# Patient Record
Sex: FEMALE | Race: WHITE | NOT HISPANIC OR LATINO | Employment: OTHER | URBAN - METROPOLITAN AREA
[De-identification: names, ages, dates, MRNs, and addresses within clinical notes are randomized per-mention and may not be internally consistent; named-entity substitution may affect disease eponyms.]

---

## 2025-02-11 ENCOUNTER — APPOINTMENT (OUTPATIENT)
Dept: RADIOLOGY | Facility: HOSPITAL | Age: 55
End: 2025-02-11
Payer: COMMERCIAL

## 2025-02-11 ENCOUNTER — HOSPITAL ENCOUNTER (EMERGENCY)
Facility: HOSPITAL | Age: 55
Discharge: HOME | End: 2025-02-11
Attending: STUDENT IN AN ORGANIZED HEALTH CARE EDUCATION/TRAINING PROGRAM
Payer: COMMERCIAL

## 2025-02-11 VITALS
WEIGHT: 245 LBS | SYSTOLIC BLOOD PRESSURE: 120 MMHG | RESPIRATION RATE: 19 BRPM | HEIGHT: 65 IN | DIASTOLIC BLOOD PRESSURE: 83 MMHG | OXYGEN SATURATION: 99 % | TEMPERATURE: 98.1 F | BODY MASS INDEX: 40.82 KG/M2 | HEART RATE: 80 BPM

## 2025-02-11 DIAGNOSIS — K44.9 HIATAL HERNIA: ICD-10-CM

## 2025-02-11 DIAGNOSIS — S52.222A CLOSED DISPLACED TRANSVERSE FRACTURE OF SHAFT OF LEFT ULNA, INITIAL ENCOUNTER: ICD-10-CM

## 2025-02-11 DIAGNOSIS — N28.89 RENAL MASS: Primary | ICD-10-CM

## 2025-02-11 DIAGNOSIS — K76.0 FATTY LIVER: ICD-10-CM

## 2025-02-11 LAB
ABO GROUP (TYPE) IN BLOOD: NORMAL
ALBUMIN SERPL BCP-MCNC: 4.1 G/DL (ref 3.4–5)
ALP SERPL-CCNC: 51 U/L (ref 33–110)
ALT SERPL W P-5'-P-CCNC: 26 U/L (ref 7–45)
AMPHETAMINES UR QL SCN: ABNORMAL
ANION GAP SERPL CALC-SCNC: 15 MMOL/L (ref 10–20)
ANTIBODY SCREEN: NORMAL
APAP SERPL-MCNC: <10 UG/ML
AST SERPL W P-5'-P-CCNC: 18 U/L (ref 9–39)
B-HCG SERPL-ACNC: <2 MIU/ML
BARBITURATES UR QL SCN: ABNORMAL
BASOPHILS # BLD AUTO: 0.03 X10*3/UL (ref 0–0.1)
BASOPHILS NFR BLD AUTO: 0.4 %
BENZODIAZ UR QL SCN: ABNORMAL
BILIRUB SERPL-MCNC: 0.2 MG/DL (ref 0–1.2)
BUN SERPL-MCNC: 17 MG/DL (ref 6–23)
BZE UR QL SCN: ABNORMAL
CALCIUM SERPL-MCNC: 9.3 MG/DL (ref 8.6–10.3)
CANNABINOIDS UR QL SCN: ABNORMAL
CHLORIDE SERPL-SCNC: 104 MMOL/L (ref 98–107)
CO2 SERPL-SCNC: 24 MMOL/L (ref 21–32)
CREAT SERPL-MCNC: 0.7 MG/DL (ref 0.5–1.05)
EGFRCR SERPLBLD CKD-EPI 2021: >90 ML/MIN/1.73M*2
EOSINOPHIL # BLD AUTO: 0.13 X10*3/UL (ref 0–0.7)
EOSINOPHIL NFR BLD AUTO: 1.9 %
ERYTHROCYTE [DISTWIDTH] IN BLOOD BY AUTOMATED COUNT: 13 % (ref 11.5–14.5)
ETHANOL SERPL-MCNC: 51 MG/DL
FENTANYL+NORFENTANYL UR QL SCN: ABNORMAL
GLUCOSE SERPL-MCNC: 108 MG/DL (ref 74–99)
HCT VFR BLD AUTO: 36.5 % (ref 36–46)
HGB BLD-MCNC: 11.8 G/DL (ref 12–16)
IMM GRANULOCYTES # BLD AUTO: 0.01 X10*3/UL (ref 0–0.7)
IMM GRANULOCYTES NFR BLD AUTO: 0.1 % (ref 0–0.9)
INR PPP: 1 (ref 0.9–1.1)
LACTATE SERPL-SCNC: 1.9 MMOL/L (ref 0.4–2)
LYMPHOCYTES # BLD AUTO: 2.13 X10*3/UL (ref 1.2–4.8)
LYMPHOCYTES NFR BLD AUTO: 30.6 %
MCH RBC QN AUTO: 29.1 PG (ref 26–34)
MCHC RBC AUTO-ENTMCNC: 32.3 G/DL (ref 32–36)
MCV RBC AUTO: 90 FL (ref 80–100)
METHADONE UR QL SCN: ABNORMAL
MONOCYTES # BLD AUTO: 0.59 X10*3/UL (ref 0.1–1)
MONOCYTES NFR BLD AUTO: 8.5 %
NEUTROPHILS # BLD AUTO: 4.06 X10*3/UL (ref 1.2–7.7)
NEUTROPHILS NFR BLD AUTO: 58.5 %
NRBC BLD-RTO: 0 /100 WBCS (ref 0–0)
OPIATES UR QL SCN: ABNORMAL
OXYCODONE+OXYMORPHONE UR QL SCN: ABNORMAL
PCP UR QL SCN: ABNORMAL
PLATELET # BLD AUTO: 263 X10*3/UL (ref 150–450)
POTASSIUM SERPL-SCNC: 3.8 MMOL/L (ref 3.5–5.3)
PROT SERPL-MCNC: 6.7 G/DL (ref 6.4–8.2)
PROTHROMBIN TIME: 11.6 SECONDS (ref 9.8–12.8)
RBC # BLD AUTO: 4.06 X10*6/UL (ref 4–5.2)
RH FACTOR (ANTIGEN D): NORMAL
SALICYLATES SERPL-MCNC: <3 MG/DL
SODIUM SERPL-SCNC: 139 MMOL/L (ref 136–145)
WBC # BLD AUTO: 7 X10*3/UL (ref 4.4–11.3)

## 2025-02-11 PROCEDURE — 36415 COLL VENOUS BLD VENIPUNCTURE: CPT | Performed by: STUDENT IN AN ORGANIZED HEALTH CARE EDUCATION/TRAINING PROGRAM

## 2025-02-11 PROCEDURE — 99291 CRITICAL CARE FIRST HOUR: CPT | Performed by: STUDENT IN AN ORGANIZED HEALTH CARE EDUCATION/TRAINING PROGRAM

## 2025-02-11 PROCEDURE — 70486 CT MAXILLOFACIAL W/O DYE: CPT

## 2025-02-11 PROCEDURE — 96365 THER/PROPH/DIAG IV INF INIT: CPT

## 2025-02-11 PROCEDURE — 72131 CT LUMBAR SPINE W/O DYE: CPT | Mod: RCN

## 2025-02-11 PROCEDURE — 2500000004 HC RX 250 GENERAL PHARMACY W/ HCPCS (ALT 636 FOR OP/ED)

## 2025-02-11 PROCEDURE — 70486 CT MAXILLOFACIAL W/O DYE: CPT | Performed by: RADIOLOGY

## 2025-02-11 PROCEDURE — 2500000001 HC RX 250 WO HCPCS SELF ADMINISTERED DRUGS (ALT 637 FOR MEDICARE OP)

## 2025-02-11 PROCEDURE — 72125 CT NECK SPINE W/O DYE: CPT

## 2025-02-11 PROCEDURE — 80307 DRUG TEST PRSMV CHEM ANLYZR: CPT | Performed by: STUDENT IN AN ORGANIZED HEALTH CARE EDUCATION/TRAINING PROGRAM

## 2025-02-11 PROCEDURE — 73110 X-RAY EXAM OF WRIST: CPT | Mod: 50

## 2025-02-11 PROCEDURE — 90471 IMMUNIZATION ADMIN: CPT | Performed by: STUDENT IN AN ORGANIZED HEALTH CARE EDUCATION/TRAINING PROGRAM

## 2025-02-11 PROCEDURE — 12001 RPR S/N/AX/GEN/TRNK 2.5CM/<: CPT

## 2025-02-11 PROCEDURE — 73090 X-RAY EXAM OF FOREARM: CPT | Mod: BILATERAL PROCEDURE | Performed by: STUDENT IN AN ORGANIZED HEALTH CARE EDUCATION/TRAINING PROGRAM

## 2025-02-11 PROCEDURE — 70450 CT HEAD/BRAIN W/O DYE: CPT

## 2025-02-11 PROCEDURE — 2500000004 HC RX 250 GENERAL PHARMACY W/ HCPCS (ALT 636 FOR OP/ED): Performed by: STUDENT IN AN ORGANIZED HEALTH CARE EDUCATION/TRAINING PROGRAM

## 2025-02-11 PROCEDURE — 70450 CT HEAD/BRAIN W/O DYE: CPT | Performed by: RADIOLOGY

## 2025-02-11 PROCEDURE — 72128 CT CHEST SPINE W/O DYE: CPT | Mod: RCN

## 2025-02-11 PROCEDURE — 96375 TX/PRO/DX INJ NEW DRUG ADDON: CPT

## 2025-02-11 PROCEDURE — 90715 TDAP VACCINE 7 YRS/> IM: CPT | Performed by: STUDENT IN AN ORGANIZED HEALTH CARE EDUCATION/TRAINING PROGRAM

## 2025-02-11 PROCEDURE — 96361 HYDRATE IV INFUSION ADD-ON: CPT

## 2025-02-11 PROCEDURE — 84075 ASSAY ALKALINE PHOSPHATASE: CPT | Performed by: STUDENT IN AN ORGANIZED HEALTH CARE EDUCATION/TRAINING PROGRAM

## 2025-02-11 PROCEDURE — 2550000001 HC RX 255 CONTRASTS: Performed by: STUDENT IN AN ORGANIZED HEALTH CARE EDUCATION/TRAINING PROGRAM

## 2025-02-11 PROCEDURE — 72170 X-RAY EXAM OF PELVIS: CPT | Performed by: RADIOLOGY

## 2025-02-11 PROCEDURE — 74177 CT ABD & PELVIS W/CONTRAST: CPT

## 2025-02-11 PROCEDURE — 73030 X-RAY EXAM OF SHOULDER: CPT | Mod: LT

## 2025-02-11 PROCEDURE — 85025 COMPLETE CBC W/AUTO DIFF WBC: CPT | Performed by: STUDENT IN AN ORGANIZED HEALTH CARE EDUCATION/TRAINING PROGRAM

## 2025-02-11 PROCEDURE — 80143 DRUG ASSAY ACETAMINOPHEN: CPT | Performed by: STUDENT IN AN ORGANIZED HEALTH CARE EDUCATION/TRAINING PROGRAM

## 2025-02-11 PROCEDURE — 73090 X-RAY EXAM OF FOREARM: CPT | Mod: 50

## 2025-02-11 PROCEDURE — 80365 DRUG SCREENING OXYCODONE: CPT | Mod: STJLAB | Performed by: STUDENT IN AN ORGANIZED HEALTH CARE EDUCATION/TRAINING PROGRAM

## 2025-02-11 PROCEDURE — 73110 X-RAY EXAM OF WRIST: CPT | Mod: BILATERAL PROCEDURE | Performed by: STUDENT IN AN ORGANIZED HEALTH CARE EDUCATION/TRAINING PROGRAM

## 2025-02-11 PROCEDURE — 86901 BLOOD TYPING SEROLOGIC RH(D): CPT | Performed by: STUDENT IN AN ORGANIZED HEALTH CARE EDUCATION/TRAINING PROGRAM

## 2025-02-11 PROCEDURE — 12001 RPR S/N/AX/GEN/TRNK 2.5CM/<: CPT | Performed by: STUDENT IN AN ORGANIZED HEALTH CARE EDUCATION/TRAINING PROGRAM

## 2025-02-11 PROCEDURE — 70498 CT ANGIOGRAPHY NECK: CPT

## 2025-02-11 PROCEDURE — 71045 X-RAY EXAM CHEST 1 VIEW: CPT | Performed by: RADIOLOGY

## 2025-02-11 PROCEDURE — 73030 X-RAY EXAM OF SHOULDER: CPT | Mod: LEFT SIDE | Performed by: STUDENT IN AN ORGANIZED HEALTH CARE EDUCATION/TRAINING PROGRAM

## 2025-02-11 PROCEDURE — 85610 PROTHROMBIN TIME: CPT | Performed by: STUDENT IN AN ORGANIZED HEALTH CARE EDUCATION/TRAINING PROGRAM

## 2025-02-11 PROCEDURE — 72170 X-RAY EXAM OF PELVIS: CPT

## 2025-02-11 PROCEDURE — 84702 CHORIONIC GONADOTROPIN TEST: CPT | Performed by: STUDENT IN AN ORGANIZED HEALTH CARE EDUCATION/TRAINING PROGRAM

## 2025-02-11 PROCEDURE — 70498 CT ANGIOGRAPHY NECK: CPT | Performed by: RADIOLOGY

## 2025-02-11 PROCEDURE — 71045 X-RAY EXAM CHEST 1 VIEW: CPT

## 2025-02-11 PROCEDURE — 96376 TX/PRO/DX INJ SAME DRUG ADON: CPT

## 2025-02-11 PROCEDURE — 76377 3D RENDER W/INTRP POSTPROCES: CPT

## 2025-02-11 PROCEDURE — 29125 APPL SHORT ARM SPLINT STATIC: CPT | Mod: LT

## 2025-02-11 PROCEDURE — 99285 EMERGENCY DEPT VISIT HI MDM: CPT | Mod: 25 | Performed by: STUDENT IN AN ORGANIZED HEALTH CARE EDUCATION/TRAINING PROGRAM

## 2025-02-11 PROCEDURE — 2500000005 HC RX 250 GENERAL PHARMACY W/O HCPCS

## 2025-02-11 PROCEDURE — 83605 ASSAY OF LACTIC ACID: CPT | Performed by: STUDENT IN AN ORGANIZED HEALTH CARE EDUCATION/TRAINING PROGRAM

## 2025-02-11 PROCEDURE — G0390 TRAUMA RESPONS W/HOSP CRITI: HCPCS

## 2025-02-11 RX ORDER — CEFAZOLIN SODIUM 2 G/100ML
2 INJECTION, SOLUTION INTRAVENOUS ONCE
Status: COMPLETED | OUTPATIENT
Start: 2025-02-11 | End: 2025-02-11

## 2025-02-11 RX ORDER — ONDANSETRON 4 MG/1
4 TABLET, ORALLY DISINTEGRATING ORAL ONCE
Status: COMPLETED | OUTPATIENT
Start: 2025-02-11 | End: 2025-02-11

## 2025-02-11 RX ORDER — BACITRACIN ZINC 500 UNIT/G
OINTMENT IN PACKET (EA) TOPICAL 3 TIMES DAILY
Status: DISCONTINUED | OUTPATIENT
Start: 2025-02-11 | End: 2025-02-12 | Stop reason: HOSPADM

## 2025-02-11 RX ORDER — ACETAMINOPHEN 325 MG/1
975 TABLET ORAL ONCE
Status: COMPLETED | OUTPATIENT
Start: 2025-02-11 | End: 2025-02-11

## 2025-02-11 RX ORDER — OXYCODONE HYDROCHLORIDE 5 MG/1
5 TABLET ORAL EVERY 6 HOURS PRN
Qty: 10 TABLET | Refills: 0 | Status: SHIPPED | OUTPATIENT
Start: 2025-02-11

## 2025-02-11 RX ORDER — MORPHINE SULFATE 4 MG/ML
INJECTION, SOLUTION INTRAMUSCULAR; INTRAVENOUS
Status: COMPLETED
Start: 2025-02-11 | End: 2025-02-11

## 2025-02-11 RX ORDER — OXYCODONE HYDROCHLORIDE 5 MG/1
5 TABLET ORAL EVERY 6 HOURS PRN
Qty: 10 TABLET | Refills: 0 | Status: SHIPPED | OUTPATIENT
Start: 2025-02-11 | End: 2025-02-11

## 2025-02-11 RX ORDER — MORPHINE SULFATE 4 MG/ML
4 INJECTION, SOLUTION INTRAMUSCULAR; INTRAVENOUS ONCE
Status: COMPLETED | OUTPATIENT
Start: 2025-02-11 | End: 2025-02-11

## 2025-02-11 RX ORDER — LIDOCAINE HYDROCHLORIDE 10 MG/ML
10 INJECTION, SOLUTION INFILTRATION; PERINEURAL ONCE
Status: COMPLETED | OUTPATIENT
Start: 2025-02-11 | End: 2025-02-11

## 2025-02-11 RX ADMIN — LIDOCAINE HYDROCHLORIDE 10 ML: 10 INJECTION, SOLUTION INFILTRATION; PERINEURAL at 21:15

## 2025-02-11 RX ADMIN — ONDANSETRON 4 MG: 4 TABLET, ORALLY DISINTEGRATING ORAL at 23:55

## 2025-02-11 RX ADMIN — MORPHINE SULFATE 4 MG: 4 INJECTION, SOLUTION INTRAMUSCULAR; INTRAVENOUS at 19:00

## 2025-02-11 RX ADMIN — CEFAZOLIN SODIUM 2 G: 2 INJECTION, SOLUTION INTRAVENOUS at 18:59

## 2025-02-11 RX ADMIN — SODIUM CHLORIDE, POTASSIUM CHLORIDE, SODIUM LACTATE AND CALCIUM CHLORIDE 1000 ML: 600; 310; 30; 20 INJECTION, SOLUTION INTRAVENOUS at 17:52

## 2025-02-11 RX ADMIN — ACETAMINOPHEN 975 MG: 325 TABLET ORAL at 23:07

## 2025-02-11 RX ADMIN — TETANUS TOXOID, REDUCED DIPHTHERIA TOXOID AND ACELLULAR PERTUSSIS VACCINE, ADSORBED 0.5 ML: 5; 2.5; 8; 8; 2.5 SUSPENSION INTRAMUSCULAR at 17:52

## 2025-02-11 RX ADMIN — IOHEXOL 150 ML: 350 INJECTION, SOLUTION INTRAVENOUS at 18:07

## 2025-02-11 RX ADMIN — MORPHINE SULFATE 4 MG: 4 INJECTION, SOLUTION INTRAMUSCULAR; INTRAVENOUS at 17:51

## 2025-02-11 RX ADMIN — BACITRACIN 1 APPLICATION: 500 OINTMENT TOPICAL at 20:12

## 2025-02-11 ASSESSMENT — PAIN SCALES - GENERAL
PAINLEVEL_OUTOF10: 7
PAINLEVEL_OUTOF10: 6
PAINLEVEL_OUTOF10: 7
PAINLEVEL_OUTOF10: 7
PAINLEVEL_OUTOF10: 9
PAINLEVEL_OUTOF10: 0 - NO PAIN
PAINLEVEL_OUTOF10: 7

## 2025-02-11 ASSESSMENT — PAIN - FUNCTIONAL ASSESSMENT: PAIN_FUNCTIONAL_ASSESSMENT: 0-10

## 2025-02-11 ASSESSMENT — COLUMBIA-SUICIDE SEVERITY RATING SCALE - C-SSRS
2. HAVE YOU ACTUALLY HAD ANY THOUGHTS OF KILLING YOURSELF?: NO
1. IN THE PAST MONTH, HAVE YOU WISHED YOU WERE DEAD OR WISHED YOU COULD GO TO SLEEP AND NOT WAKE UP?: NO
6. HAVE YOU EVER DONE ANYTHING, STARTED TO DO ANYTHING, OR PREPARED TO DO ANYTHING TO END YOUR LIFE?: NO

## 2025-02-11 ASSESSMENT — LIFESTYLE VARIABLES
HAVE YOU EVER FELT YOU SHOULD CUT DOWN ON YOUR DRINKING: NO
TOTAL SCORE: 0
EVER HAD A DRINK FIRST THING IN THE MORNING TO STEADY YOUR NERVES TO GET RID OF A HANGOVER: NO
EVER FELT BAD OR GUILTY ABOUT YOUR DRINKING: NO
HAVE PEOPLE ANNOYED YOU BY CRITICIZING YOUR DRINKING: NO

## 2025-02-12 NOTE — DISCHARGE INSTRUCTIONS
Follow-up with your primary care physician within 48 hours regarding your ED visit.  You need to follow-up with your primary care physician within 10 days for suture removal.    You have also been referred to Dr. Zhao a hand surgeon to follow-up for your right wrist dislocation in a week's time.  You should also follow-up with Dr. Kaur in a week time for your left arm fracture.    Contact [facility] to follow-up on the incidental finding of a renal mass on your CT abdomen and pelvis.    Use over-the-counter Tylenol or Motrin for pain control as needed.  Use the prescribed oxycodone for breakthrough pain as needed.    Seek immediate medical attention if you experience worsening symptoms including severe headache, blurry vision, pain around your eye, dizziness, lightheadedness, neck pain, chest pain, swelling of your arm, discoloration of your arm, numbness, tingling or weakness in your arms.

## 2025-02-12 NOTE — ED PROCEDURE NOTE
Procedure  Laceration Repair    Performed by: Daylin Driscoll MD  Authorized by: Marcelo King DO    Consent:     Consent obtained:  Verbal and written    Consent given by:  Patient    Risks discussed:  Infection and pain    Alternatives discussed:  No treatment and delayed treatment  Universal protocol:     Patient identity confirmed:  Verbally with patient, arm band, hospital-assigned identification number and provided demographic data  Anesthesia:     Anesthesia method:  Local infiltration    Local anesthetic:  Lidocaine 1% w/o epi  Laceration details:     Location:  Finger    Finger location:  R ring finger    Length (cm):  2.5    Depth (mm):  1  Exploration:     Limited defect created (wound extended): no      Imaging obtained: x-ray      Imaging outcome: foreign body not noted    Treatment:     Area cleansed with:  Saline and chlorhexidine    Amount of cleaning:  Extensive    Irrigation solution:  Sterile saline    Irrigation method:  Pressure wash    Visualized foreign bodies/material removed: no      Debridement:  None    Undermining:  None    Scar revision: no    Skin repair:     Repair method:  Sutures    Suture size:  4-0    Suture material:  Prolene    Suture technique:  Simple interrupted and running    Number of sutures:  4  Approximation:     Approximation:  Close  Repair type:     Repair type:  Simple  Post-procedure details:     Procedure completion:  Tolerated               Daylin Driscoll MD  Resident  02/14/25 2011

## 2025-02-12 NOTE — ED PROVIDER NOTES
Emergency Department Provider Note        History of Present Illness     History provided by: Patient  Limitations to History: None  External Records Reviewed with Brief Summary: None    HPI:  Mellisa Green is a 54 y.o. female with history of iron deficiency anemia, alcohol abuse, who presented to the ED for trauma.  Patient was in the barn trying to let their horse out when the horse attacked her from the back, and throwing her against the wall.  The horse threw a back kick towards her, which she caught with her hands, but hitting her face. around and kicked kicked her which she blocked with her hands.  In the event of protecting her face, against the wall, then kicked.      Physical Exam   Triage vitals:  T 36.7 °C (98.1 °F)  HR 66  /86  RR 18  O2 98 % None (Room air)    General: Awake, alert, in no acute distress  Eyes: Gaze conjugate.  No scleral icterus or injection.  Left infraorbital ecchymosis.  HENT: Normo-cephalic, atraumatic. No stridor.  Bilateral facial swelling (hematoma) left greater than right.  CV: Regular rate, regular rhythm. Radial pulses 2+ bilaterally  Resp: Breathing non-labored, speaking in full sentences.  Clear to auscultation bilaterally  GI: Soft, non-distended, non-tender. No rebound or guarding.  : Not examined.  MSK/Extremities: No gross bony deformities. Moving all extremities.  Patient sustained a 2 cm laceration to the volar right ring finger between the PIP and DIP joint, and she received 4 sutures for closed laceration repair.  There is also a 0.5 cm distal right thumb on the medial side, for which she received 1 suture for close laceration repair.  This right distal forearm swelling, with tenderness.  No gross deformity of the wrist, wrist tenderness suggest possible dislocation seen on x-ray of the right wrist.  There is swelling of the mid forearm of the left upper extremity, with gross deformity due to mid ulnar diaphysis.   Skin: Warm. Appropriate color  Neuro:  Alert. Oriented. Face symmetric. Speech is fluent.  Gross strength and sensation intact in b/l UE and LEs  Psych: Appropriate mood and affect    Medical Decision Making & ED Course   Medical Decision Makin y.o. female with history of iron deficiency anemia, alcohol abuse, who presented to the ED for trauma.  On arrival to the ED, the patient was stable, A&Ox3, non-toxic, well-appearing, and in no acute distress. Primary assessment is evident for intact ABC. The patient was able to give account of current event and verbalize presenting symptoms.    My personal assessment is directed towards ordering labs including CBC, CMP, urine drug screen, acute tachycardia panel, and type and screen preop lab.  Also, I ordered chest x-ray, x-ray of the left shoulder, x-ray of bilateral forearm, x-ray of bilateral wrist, and x-ray of the pelvis.  In addition we ordered CT head without IV contrast, CT cervical spine without IV contrast, CT maxillofacial bone with 3D reconstruction, CT of abdomen and pelvis with IV contrast, CT thoracic spine without IV contrast and CT lumbar spine without IV contrast to rule out possible cardiopulmonary abnormality, or critical esophageal etiology, and evidence of head trauma, intracranial bleeding, C-spine fracture or subluxation, T or L-spine traumatic injury, or traumatic injury of the abdomen and pelvis.    Lab shows no evidence of significant anemia, or leukocytosis.  PT/INR were normal.  Lactate was normal.  Chemistry panel was unremarkable.  Acute diverticulitis screening was positive for elevated alcohol level.  Tylenol and aspirin and within normal ranges.  hCG was negative for pregnancy.  Urine drug screen was positive for opiate.  Forearm x-ray shows     LEFT FOREARM: There is an acute minimally displaced transverse  fracture of the mid ulnar diaphysis. No evidence of acute fracture to  the left radius.      RIGHT FOREARM: No acute fracture of the right radius or ulna.  However  there is suspicion for dorsal dislocation of the distal radioulnar  joint with soft tissue swelling.      ----  Differential diagnoses considered include but are not limited to: Trauma    Care Considerations: As documented above in [facility]    ED Course:  ED Course as of 02/14/25 1430   Tue Feb 11, 2025   1755 Patient presenting hemodynamically stable, GCS 15 with an intact primary survey.  Unknown last tetanus shot.  Reports that she has anemia for which he takes an iron supplement and also only other medication would be a vitamin supplement.  Reportedly pinned up against a fence by her horse and then she fell and the horse then kicked her while she was lying on the ground over the face.  Denies LOC.  Does not believe she laid on the ground for any prolonged period of time.  She was initially at the bar by herself.  Denies use of anticoagulation.  Minimal abrasions over the bilateral hands with dried blood but no active sign of bleeding.  Neurovascular intact in all extremities.  Patient does have pain to palpation of the left shoulder, bilateral wrist and distal forearms, left facial bones.  Extraocular muscles are intact.  Pupils are equal and reactive.  She denies any diplopia or vision changes. [TL]   1847 Mild alcohol intoxication noted on blood work.  Patient has no clinical signs of intoxication at this time. [TL]   1848 CT imaging of the head, maxillofacial bones, angio of the neck, cervical spine all within normal limits.  Cervical collar cleared at this time. [TL]   1855 Repeat dose of morphine ordered at this time for pain control. [TL]   1907 Incidental findings noted.  Patient to follow-up as an outpatient with the [facility].  Referral placed [TL]   1910 [LEFT FOREARM: [There is an acute minimally displaced transverse fracture of the mid ulnar diaphysis.]] No evidence of acute fracture to the left radius. RIGHT FOREARM: [No acute fracture of the right radius or ulna. However there is suspicion  for dorsal dislocation of the distal radioulnar joint with soft tissue swelling.]    [TL]   1911 Call placed at this time to the trauma and orthopedic consultation services. [TL]   2043 RIGHT WRIST: There is soft tissue swelling along the dorsum of the  wrist in suspicion for dorsal dislocation of the distal radioulnar  joint. No associated fractures of the right wrist or malalignment of  the carpal bones.   [TL]   2049 Informed by transfer center that hand surgery at [facility] is currently in the OR.  They will call back when done. [TL]   2128 Pending callback from hand surgery.  Patient madhu hemodynamically stable.  Splint Application:  The patient was placed in a left upper extremity sugar-tong splint. Dr Driscoll assisted with the splint.  Patient tolerated procedure well with no difficulty. Patient had appropriate alignment of the affected/injured area. The patient was neurovascularly intact pre/post application of the splint. Splint care education given to patient and reasons to come back to the ED. Emphasis was placed on need to follow up with orthopedic surgery and patient expressed understanding. [TL]   2247 I reached back out to transfer center.  Still pending callback from the orthopedic hand service. [TL]   2310 Dr. Kaur recommends consultation with hand surgery coverge at [facility] regarding right wrist findings. [TL]      ED Course User Index  [TL] Marcelo King, DO         Diagnoses as of 02/14/25 1430   Renal mass   Hiatal hernia   Fatty liver   Closed displaced transverse fracture of shaft of left ulna, initial encounter     Disposition   As a result of the work-up, the patient was discharged home.  she was informed of her diagnosis and instructed to come back with any concerns or worsening of condition.  she and was agreeable to the plan as discussed above.  she was given the opportunity to ask questions.  All of the patient's questions were answered.    Procedures   Procedures    This was a  shared visit with an ED attending.  The patient was seen and discussed with the ED attending, Dr. King.    Daylin Driscoll MD  Emergency Medicine, PGY-2   physical examination of the patient and discussed his management with the resident physician.  I agree with the history, physical, assessment, and plan of care, with the following exceptions:   None    I was present for key and critical portions of the following procedures: Critical care  Time Spent in Critical Care of the patient: 35  Time spent in discussions with the patient and family: 30    DO Marcelo Mcelroy DO  02/17/25 4224

## 2025-02-13 LAB
6MAM UR CFM-MCNC: <25 NG/ML
CODEINE UR CFM-MCNC: <50 NG/ML
HYDROCODONE CTO UR CFM-MCNC: <25 NG/ML
HYDROMORPHONE UR CFM-MCNC: <25 NG/ML
MORPHINE UR CFM-MCNC: 2007 NG/ML
NORHYDROCODONE UR CFM-MCNC: <25 NG/ML
NOROXYCODONE UR CFM-MCNC: <25 NG/ML
OXYCODONE UR CFM-MCNC: <25 NG/ML
OXYMORPHONE UR CFM-MCNC: <25 NG/ML

## 2025-02-18 ENCOUNTER — TELEPHONE (OUTPATIENT)
Dept: HEMATOLOGY/ONCOLOGY | Facility: HOSPITAL | Age: 55
End: 2025-02-18
Payer: COMMERCIAL

## 2025-02-18 NOTE — TELEPHONE ENCOUNTER
Referral placed to [facility] by Dr. Marcelo King, [facility] ED, for 2 cm fat containing right kidney nodule, noted on CT A/P imaging 2/11/25. I called the patient and discussed her imaging results and the referral. She was already aware of this CT finding & is following w/ her PCP at [facility]. She prefers evaluation at [facility] & declines assistance, but expressed gratitude for the follow-up.  ALLYSON Robles.CNP  [facility]